# Patient Record
Sex: MALE | Race: WHITE
[De-identification: names, ages, dates, MRNs, and addresses within clinical notes are randomized per-mention and may not be internally consistent; named-entity substitution may affect disease eponyms.]

---

## 2022-06-14 ENCOUNTER — HOSPITAL ENCOUNTER (EMERGENCY)
Dept: HOSPITAL 26 - MED | Age: 32
Discharge: HOME | End: 2022-06-14
Payer: SELF-PAY

## 2022-06-14 VITALS — DIASTOLIC BLOOD PRESSURE: 57 MMHG | SYSTOLIC BLOOD PRESSURE: 90 MMHG

## 2022-06-14 VITALS — DIASTOLIC BLOOD PRESSURE: 64 MMHG | SYSTOLIC BLOOD PRESSURE: 125 MMHG

## 2022-06-14 VITALS — WEIGHT: 192 LBS | HEIGHT: 68 IN | BODY MASS INDEX: 29.1 KG/M2

## 2022-06-14 DIAGNOSIS — M79.604: Primary | ICD-10-CM

## 2022-06-14 PROCEDURE — 99283 EMERGENCY DEPT VISIT LOW MDM: CPT

## 2022-06-14 PROCEDURE — 29515 APPLICATION SHORT LEG SPLINT: CPT

## 2022-06-14 PROCEDURE — 73590 X-RAY EXAM OF LOWER LEG: CPT

## 2022-06-14 NOTE — NUR
PT PLACED IN 5" ORTHOGLASS POSTERIOR SHORT LEG SPLINT AND WRAPPED WITH 3" ACE 
WRAPS X4. CMS WNL BEFORE AND AFTER. PT ALSO GIVEN CRUTCHES THAT WERE ADJUSTED 
TO PT'S SIZE AND HEIGHT. PT STATED THAT THEY ALREADY KNOW HOW TO USE CRUTCHES 
AND SHOWED PROPER DEMONSTRATION ON HOW TO USE CRUTCHES. PT HAD NO FURTHER 
QUESTIONS. PA AND RN NOTIFIED.

## 2022-06-14 NOTE — NUR
WALKED IN C/O RIGHT ANKLE INJURY. DENIES HEAD TRAUMA. DENIES LOC. PRESENTS WITH 
RIGHT ANKLE EDEMA AND BRUISING. DENIES NUMBNESS. AAOX3, AMBULATORY, VSS.